# Patient Record
Sex: MALE | NOT HISPANIC OR LATINO | Employment: FULL TIME | URBAN - METROPOLITAN AREA
[De-identification: names, ages, dates, MRNs, and addresses within clinical notes are randomized per-mention and may not be internally consistent; named-entity substitution may affect disease eponyms.]

---

## 2021-09-13 ENCOUNTER — APPOINTMENT (OUTPATIENT)
Dept: CT IMAGING | Facility: CLINIC | Age: 36
DRG: 177 | End: 2021-09-13
Attending: STUDENT IN AN ORGANIZED HEALTH CARE EDUCATION/TRAINING PROGRAM
Payer: COMMERCIAL

## 2021-09-13 ENCOUNTER — HOSPITAL ENCOUNTER (INPATIENT)
Facility: CLINIC | Age: 36
LOS: 1 days | Discharge: HOME | DRG: 177 | End: 2021-09-14
Attending: STUDENT IN AN ORGANIZED HEALTH CARE EDUCATION/TRAINING PROGRAM | Admitting: STUDENT IN AN ORGANIZED HEALTH CARE EDUCATION/TRAINING PROGRAM
Payer: COMMERCIAL

## 2021-09-13 DIAGNOSIS — U07.1 CLINICAL DIAGNOSIS OF COVID-19: ICD-10-CM

## 2021-09-13 DIAGNOSIS — U07.1 COVID-19: Primary | ICD-10-CM

## 2021-09-13 PROBLEM — R19.7 DIARRHEA OF PRESUMED INFECTIOUS ORIGIN: Status: ACTIVE | Noted: 2021-09-13

## 2021-09-13 LAB
ALBUMIN SERPL-MCNC: 3.3 G/DL (ref 3.5–5)
ALP SERPL-CCNC: 62 U/L (ref 45–120)
ALT SERPL W P-5'-P-CCNC: 13 U/L (ref 0–45)
ANION GAP SERPL CALCULATED.3IONS-SCNC: 11 MMOL/L (ref 5–18)
AST SERPL W P-5'-P-CCNC: 23 U/L (ref 0–40)
BASOPHILS # BLD AUTO: 0 10E3/UL (ref 0–0.2)
BASOPHILS NFR BLD AUTO: 0 %
BILIRUB SERPL-MCNC: 0.4 MG/DL (ref 0–1)
BUN SERPL-MCNC: 11 MG/DL (ref 8–22)
C REACTIVE PROTEIN LHE: 4.2 MG/DL (ref 0–0.8)
CALCIUM SERPL-MCNC: 8.4 MG/DL (ref 8.5–10.5)
CHLORIDE BLD-SCNC: 102 MMOL/L (ref 98–107)
CO2 SERPL-SCNC: 25 MMOL/L (ref 22–31)
CREAT SERPL-MCNC: 0.91 MG/DL (ref 0.7–1.3)
D DIMER PPP FEU-MCNC: 0.71 UG/ML FEU (ref 0–0.5)
EOSINOPHIL # BLD AUTO: 0 10E3/UL (ref 0–0.7)
EOSINOPHIL NFR BLD AUTO: 0 %
ERYTHROCYTE [DISTWIDTH] IN BLOOD BY AUTOMATED COUNT: 11.6 % (ref 10–15)
GFR SERPL CREATININE-BSD FRML MDRD: >90 ML/MIN/1.73M2
GLUCOSE BLD-MCNC: 107 MG/DL (ref 70–125)
HCT VFR BLD AUTO: 37.9 % (ref 40–53)
HGB BLD-MCNC: 13 G/DL (ref 13.3–17.7)
IMM GRANULOCYTES # BLD: 0 10E3/UL
IMM GRANULOCYTES NFR BLD: 0 %
LYMPHOCYTES # BLD AUTO: 1 10E3/UL (ref 0.8–5.3)
LYMPHOCYTES NFR BLD AUTO: 18 %
MCH RBC QN AUTO: 31.8 PG (ref 26.5–33)
MCHC RBC AUTO-ENTMCNC: 34.3 G/DL (ref 31.5–36.5)
MCV RBC AUTO: 93 FL (ref 78–100)
MONOCYTES # BLD AUTO: 0.2 10E3/UL (ref 0–1.3)
MONOCYTES NFR BLD AUTO: 5 %
NEUTROPHILS # BLD AUTO: 4.1 10E3/UL (ref 1.6–8.3)
NEUTROPHILS NFR BLD AUTO: 77 %
NRBC # BLD AUTO: 0 10E3/UL
NRBC BLD AUTO-RTO: 0 /100
PLATELET # BLD AUTO: 147 10E3/UL (ref 150–450)
POTASSIUM BLD-SCNC: 4.4 MMOL/L (ref 3.5–5)
PROT SERPL-MCNC: 6.5 G/DL (ref 6–8)
RBC # BLD AUTO: 4.09 10E6/UL (ref 4.4–5.9)
SODIUM SERPL-SCNC: 138 MMOL/L (ref 136–145)
TROPONIN I SERPL-MCNC: <0.01 NG/ML (ref 0–0.29)
TROPONIN T BLD-MCNC: 0 UG/L
WBC # BLD AUTO: 5.4 10E3/UL (ref 4–11)

## 2021-09-13 PROCEDURE — 250N000013 HC RX MED GY IP 250 OP 250 PS 637: Performed by: STUDENT IN AN ORGANIZED HEALTH CARE EDUCATION/TRAINING PROGRAM

## 2021-09-13 PROCEDURE — 85379 FIBRIN DEGRADATION QUANT: CPT | Performed by: STUDENT IN AN ORGANIZED HEALTH CARE EDUCATION/TRAINING PROGRAM

## 2021-09-13 PROCEDURE — 84484 ASSAY OF TROPONIN QUANT: CPT | Performed by: STUDENT IN AN ORGANIZED HEALTH CARE EDUCATION/TRAINING PROGRAM

## 2021-09-13 PROCEDURE — 36415 COLL VENOUS BLD VENIPUNCTURE: CPT | Performed by: STUDENT IN AN ORGANIZED HEALTH CARE EDUCATION/TRAINING PROGRAM

## 2021-09-13 PROCEDURE — 84484 ASSAY OF TROPONIN QUANT: CPT | Mod: 91 | Performed by: STUDENT IN AN ORGANIZED HEALTH CARE EDUCATION/TRAINING PROGRAM

## 2021-09-13 PROCEDURE — 96361 HYDRATE IV INFUSION ADD-ON: CPT

## 2021-09-13 PROCEDURE — 85025 COMPLETE CBC W/AUTO DIFF WBC: CPT | Performed by: STUDENT IN AN ORGANIZED HEALTH CARE EDUCATION/TRAINING PROGRAM

## 2021-09-13 PROCEDURE — 80053 COMPREHEN METABOLIC PANEL: CPT | Performed by: STUDENT IN AN ORGANIZED HEALTH CARE EDUCATION/TRAINING PROGRAM

## 2021-09-13 PROCEDURE — 71275 CT ANGIOGRAPHY CHEST: CPT

## 2021-09-13 PROCEDURE — 96375 TX/PRO/DX INJ NEW DRUG ADDON: CPT

## 2021-09-13 PROCEDURE — 120N000001 HC R&B MED SURG/OB

## 2021-09-13 PROCEDURE — 250N000011 HC RX IP 250 OP 636: Performed by: STUDENT IN AN ORGANIZED HEALTH CARE EDUCATION/TRAINING PROGRAM

## 2021-09-13 PROCEDURE — 99285 EMERGENCY DEPT VISIT HI MDM: CPT | Mod: 25

## 2021-09-13 PROCEDURE — 258N000003 HC RX IP 258 OP 636: Performed by: STUDENT IN AN ORGANIZED HEALTH CARE EDUCATION/TRAINING PROGRAM

## 2021-09-13 PROCEDURE — 86141 C-REACTIVE PROTEIN HS: CPT | Performed by: STUDENT IN AN ORGANIZED HEALTH CARE EDUCATION/TRAINING PROGRAM

## 2021-09-13 PROCEDURE — 250N000009 HC RX 250: Performed by: STUDENT IN AN ORGANIZED HEALTH CARE EDUCATION/TRAINING PROGRAM

## 2021-09-13 PROCEDURE — XW033E5 INTRODUCTION OF REMDESIVIR ANTI-INFECTIVE INTO PERIPHERAL VEIN, PERCUTANEOUS APPROACH, NEW TECHNOLOGY GROUP 5: ICD-10-PCS | Performed by: STUDENT IN AN ORGANIZED HEALTH CARE EDUCATION/TRAINING PROGRAM

## 2021-09-13 PROCEDURE — 93005 ELECTROCARDIOGRAM TRACING: CPT | Performed by: STUDENT IN AN ORGANIZED HEALTH CARE EDUCATION/TRAINING PROGRAM

## 2021-09-13 PROCEDURE — 96365 THER/PROPH/DIAG IV INF INIT: CPT | Mod: 59

## 2021-09-13 RX ORDER — ACETAMINOPHEN 325 MG/1
650 TABLET ORAL ONCE
Status: COMPLETED | OUTPATIENT
Start: 2021-09-13 | End: 2021-09-13

## 2021-09-13 RX ORDER — ONDANSETRON 4 MG/1
4 TABLET, ORALLY DISINTEGRATING ORAL EVERY 6 HOURS PRN
Status: DISCONTINUED | OUTPATIENT
Start: 2021-09-13 | End: 2021-09-14 | Stop reason: HOSPADM

## 2021-09-13 RX ORDER — ONDANSETRON 2 MG/ML
4 INJECTION INTRAMUSCULAR; INTRAVENOUS EVERY 6 HOURS PRN
Status: DISCONTINUED | OUTPATIENT
Start: 2021-09-13 | End: 2021-09-14 | Stop reason: HOSPADM

## 2021-09-13 RX ORDER — LIDOCAINE 40 MG/G
CREAM TOPICAL
Status: DISCONTINUED | OUTPATIENT
Start: 2021-09-13 | End: 2021-09-14 | Stop reason: HOSPADM

## 2021-09-13 RX ORDER — DEXAMETHASONE SODIUM PHOSPHATE 10 MG/ML
8 INJECTION, SOLUTION INTRAMUSCULAR; INTRAVENOUS ONCE
Status: COMPLETED | OUTPATIENT
Start: 2021-09-13 | End: 2021-09-13

## 2021-09-13 RX ORDER — KETOROLAC TROMETHAMINE 15 MG/ML
15 INJECTION, SOLUTION INTRAMUSCULAR; INTRAVENOUS ONCE
Status: COMPLETED | OUTPATIENT
Start: 2021-09-13 | End: 2021-09-13

## 2021-09-13 RX ORDER — ACETAMINOPHEN 325 MG/1
975 TABLET ORAL EVERY 8 HOURS PRN
Status: DISCONTINUED | OUTPATIENT
Start: 2021-09-13 | End: 2021-09-14 | Stop reason: HOSPADM

## 2021-09-13 RX ORDER — ACETAMINOPHEN 325 MG/1
650 TABLET ORAL EVERY 6 HOURS PRN
COMMUNITY

## 2021-09-13 RX ORDER — POLYETHYLENE GLYCOL 3350 17 G/17G
17 POWDER, FOR SOLUTION ORAL DAILY PRN
Status: DISCONTINUED | OUTPATIENT
Start: 2021-09-13 | End: 2021-09-14 | Stop reason: HOSPADM

## 2021-09-13 RX ORDER — IOPAMIDOL 755 MG/ML
100 INJECTION, SOLUTION INTRAVASCULAR ONCE
Status: COMPLETED | OUTPATIENT
Start: 2021-09-13 | End: 2021-09-13

## 2021-09-13 RX ADMIN — REMDESIVIR 200 MG: 100 INJECTION, POWDER, LYOPHILIZED, FOR SOLUTION INTRAVENOUS at 14:57

## 2021-09-13 RX ADMIN — SODIUM CHLORIDE 50 ML: 9 INJECTION, SOLUTION INTRAVENOUS at 15:57

## 2021-09-13 RX ADMIN — ACETAMINOPHEN 650 MG: 325 TABLET ORAL at 13:43

## 2021-09-13 RX ADMIN — SODIUM CHLORIDE 1000 ML: 9 INJECTION, SOLUTION INTRAVENOUS at 10:03

## 2021-09-13 RX ADMIN — KETOROLAC TROMETHAMINE 15 MG: 15 INJECTION, SOLUTION INTRAMUSCULAR; INTRAVENOUS at 14:52

## 2021-09-13 RX ADMIN — DEXAMETHASONE SODIUM PHOSPHATE 8 MG: 10 INJECTION, SOLUTION INTRAMUSCULAR; INTRAVENOUS at 14:53

## 2021-09-13 RX ADMIN — IOPAMIDOL 100 ML: 755 INJECTION, SOLUTION INTRAVENOUS at 12:51

## 2021-09-13 ASSESSMENT — ACTIVITIES OF DAILY LIVING (ADL)
DIFFICULTY_EATING/SWALLOWING: NO
DOING_ERRANDS_INDEPENDENTLY_DIFFICULTY: NO
DIFFICULTY_COMMUNICATING: NO
CONCENTRATING,_REMEMBERING_OR_MAKING_DECISIONS_DIFFICULTY: NO
WEAR_GLASSES_OR_BLIND: NO
DRESSING/BATHING_DIFFICULTY: NO
TOILETING_ISSUES: NO
WALKING_OR_CLIMBING_STAIRS_DIFFICULTY: NO
FALL_HISTORY_WITHIN_LAST_SIX_MONTHS: NO
DEPENDENT_IADLS:: INDEPENDENT

## 2021-09-13 ASSESSMENT — MIFFLIN-ST. JEOR
SCORE: 1826.02
SCORE: 1828.04

## 2021-09-13 NOTE — ED PROVIDER NOTES
"  Emergency Department Encounter         FINAL IMPRESSION:  Covid, dehydration        ED COURSE AND MEDICAL DECISION MAKING       ED Course as of Sep 13 1717   Mon Sep 13, 2021   0954 EKG is sinus rhythm of 63, no signs of acute ischemia, no inversions no depressions , no old EKGs for comparison.      1025 Patient is a healthy 36-year-old here from home with a presyncopal event and diarrhea as well as shortness of breath and cough and Covid positive symptoms for the past week.  States this morning he was getting up and felt like he was in a pass out.  States he got tunnel vision, his ear started ringing and he stated that he sat down all fours as he stated \"I could feel it coming on.\"  Never actually lost consciousness.  Has been Covid positive for the past week.  No chest pain.  Mild cough.  No shortness of breath.  On arrival he is mildly hypoxic.  Gets mildly hypoxic with ambulation  However he is asymptomatic with hypoxia.      -Patient CTA unremarkable. Covid changes. Patient given fluids. Persisting hypoxia with sleeping down to the low to mid 80s. Oxygen placed. Improved. Patient admitted to the resident service. Received Decadron and remdesivir    9:54 AM I reviewed the patient's EKG.  10:13 AM I met the patient and performed my initial interview and exam.  4:25 PM I rechecked and updated the patient.   4:50 PM I spoke with resident Kulwinder, regarding admission for patient.                              EKG  EKG is sinus rhythm of 63, no signs of acute ischemia, no inversions no depressions , no old EKGs for comparison.     At the conclusion of the encounter I discussed the results of all the tests and the disposition. The questions were answered. The patient or family acknowledged understanding and was agreeable with the care plan.                  MEDICATIONS GIVEN IN THE EMERGENCY DEPARTMENT:  Medications   remdesivir 100 mg in sodium chloride 0.9 % 250 mL intermittent infusion (has no " "administration in time range)     And   0.9% sodium chloride BOLUS (has no administration in time range)   0.9% sodium chloride BOLUS (0 mLs Intravenous Stopped 9/13/21 1103)   iopamidol (ISOVUE-370) solution 100 mL (100 mLs Intravenous Given 9/13/21 1251)   acetaminophen (TYLENOL) tablet 650 mg (650 mg Oral Given 9/13/21 1343)   dexamethasone PF (DECADRON) injection 8 mg (8 mg Intravenous Given 9/13/21 1453)   remdesivir 200 mg in sodium chloride 0.9 % 250 mL intermittent infusion (0 mg Intravenous Stopped 9/13/21 1548)     Followed by   0.9% sodium chloride BOLUS (0 mLs Intravenous Stopped 9/13/21 1619)   ketorolac (TORADOL) injection 15 mg (15 mg Intravenous Given 9/13/21 1452)       NEW PRESCRIPTIONS STARTED AT TODAY'S ED VISIT:  New Prescriptions    No medications on file       HPI     Patient information obtained from: Patient    Use of Interpretor: N/A     Bernardino MAYS Olayinka is a 36 year old male with no known pertinent history who presents to this ED via walk in for evaluation of presyncopal event.    Patient reports this morning he was getting up and felt like he was going to pass out. He states he got tunnel vision and his ears began to ring, so he sat down on all fours as he \"could feel it coming on\". Patient never actually lost consciousness. Of note, patient has been COVID positive for the past week. His symptoms include diarrhea, shortness of breath, and a mild cough. Denies chest pain or any other complaints or concerns at this time.     REVIEW OF SYSTEMS:  Review of Systems   Constitutional: Negative for fever, malaise  HEENT: Negative runny nose, sore throat, ear pain, neck pain. Positive for tinnitus.   Respiratory: Negative for congestion. Positive for shortness of breath and cough (mild).  Cardiovascular: Negative for chest pain, leg edema  Gastrointestinal: Negative for abdominal distention, abdominal pain, constipation, vomiting, nausea. Positive for diarrhea.   Genitourinary: Negative for " "dysuria and hematuria.   Integument: Negative for rash, skin breakdown  Neurological: Negative for paresthesias, weakness, headache. Positive for presyncope  Musculoskeletal: Negative for joint pain, joint swelling      All other systems reviewed and are negative.          MEDICAL HISTORY     History reviewed. No pertinent past medical history.    History reviewed. No pertinent surgical history.    Social History     Tobacco Use     Smoking status: None   Substance Use Topics     Alcohol use: None     Drug use: None       acetaminophen (TYLENOL) 325 MG tablet            PHYSICAL EXAM     /72   Pulse 71   Temp 99.7  F (37.6  C) (Oral)   Resp 27   Ht 1.753 m (5' 9\")   Wt 90.3 kg (199 lb)   SpO2 96%   BMI 29.39 kg/m        PHYSICAL EXAM:     General: Patient appears well, nontoxic, comfortable  HEENT: Moist mucous membranes, no tongue swelling.  No head trauma.  No midline neck pain.  Cardiovascular: Normal rate, normal rhythm, no extremity edema.  No appreciable murmur.  Respiratory: Lungs are clear to auscultation bilaterally with no wheezes rhonchi or rales. Mildly hypoxic with ambulation.  Abdominal: Soft, nontender, nondistended, no palpable masses, no guarding, no rebound  Musculoskeletal: Full range of motion of joints, no deformities appreciated.  Neurological: Alert and oriented, grossly neurologically intact.  Psychological: Normal affect and mood.  Integument: No rashes appreciated        RESULTS       Labs Ordered and Resulted from Time of ED Arrival Up to the Time of Departure from the ED   COMPREHENSIVE METABOLIC PANEL - Abnormal; Notable for the following components:       Result Value    Calcium 8.4 (*)     Albumin 3.3 (*)     All other components within normal limits   CBC WITH PLATELETS AND DIFFERENTIAL - Abnormal; Notable for the following components:    RBC Count 4.09 (*)     Hemoglobin 13.0 (*)     Hematocrit 37.9 (*)     Platelet Count 147 (*)     All other components within normal " limits   D DIMER QUANTITATIVE - Abnormal; Notable for the following components:    D-Dimer Quantitative 0.71 (*)     All other components within normal limits    Narrative:     This D-dimer assay is intended for use in conjunction with a clinical pretest probability assessment model to exclude pulmonary embolism (PE) and deep venous thrombosis (DVT) in outpatients suspected of PE or DVT. The cut-off value is 0.50 ug/mL FEU.   TROPONIN I - Normal   ISTAT TROPONIN POCT - Normal   CBC WITH PLATELETS & DIFFERENTIAL    Narrative:     The following orders were created for panel order CBC with platelets differential.  Procedure                               Abnormality         Status                     ---------                               -----------         ------                     CBC with platelets and d...[[id number]]  Abnormal            Final result                 Please view results for these tests on the individual orders.   PERIPHERAL IV CATHETER   CALL       CT Chest Pulmonary Embolism w Contrast   Final Result   IMPRESSION:      1.  Mild motion artifact.      2.  No pulmonary embolism seen.      3.  Mild-moderate pulmonary opacities typical of COVID.      4.  Mild airway thickening / bronchitis.      5.  Mildly prominent right heart of indeterminate etiology and incompletely characterized given this is a nongated exam. Correlate for evidence of elevated right heart pressures.                                 PROCEDURES:  Procedures:  Procedures       I, Cori Wiggins am serving as a scribe to document services personally performed by Conrado Guzman DO, based on my observations and the provider's statements to me.  I, Conrado Guzman DO, attest that Cori Wiggins is acting in a scribe capacity, has observed my performance of the services and has documented them in accordance with my direction.    Conrado Guzman DO  Emergency Medicine  [facility] EMERGENCY ROOM     OhConrado limon,  DO  09/13/21 1738

## 2021-09-13 NOTE — CONSULTS
Care Management Initial Consult    General Information  Assessment completed with: Patient,    Type of CM/SW Visit: Initial Assessment    Primary Care Provider verified and updated as needed:     Readmission within the last 30 days:        Reason for Consult: discharge planning  Advance Care Planning:            Communication Assessment  Patient's communication style: spoken language (English or Bilingual)    Hearing Difficulty or Deaf: no   Wear Glasses or Blind: no    Cognitive  Cognitive/Neuro/Behavioral: WDL                      Living Environment:   People in home: spouse     Current living Arrangements: house      Able to return to prior arrangements: yes       Family/Social Support:  Care provided by: self  Provides care for: no one  Marital Status:   Wife  Rowan       Description of Support System: Supportive    Support Assessment: Adequate family and caregiver support    Current Resources:   Patient receiving home care services: No     Community Resources: None  Equipment currently used at home: none  Supplies currently used at home: None    Employment/Financial:  Employment Status: employed full-time        Financial Concerns: No concerns identified           Lifestyle & Psychosocial Needs:  Social Determinants of Health     Tobacco Use:      Smoking Tobacco Use:      Smokeless Tobacco Use:    Alcohol Use:      Frequency of Alcohol Consumption:      Average Number of Drinks:      Frequency of Binge Drinking:    Financial Resource Strain:      Difficulty of Paying Living Expenses:    Food Insecurity:      Worried About Running Out of Food in the Last Year:      Ran Out of Food in the Last Year:    Transportation Needs:      Lack of Transportation (Medical):      Lack of Transportation (Non-Medical):    Physical Activity:      Days of Exercise per Week:      Minutes of Exercise per Session:    Stress:      Feeling of Stress :    Social Connections:      Frequency of Communication with Friends and  Family:      Frequency of Social Gatherings with Friends and Family:      Attends Mandaen Services:      Active Member of Clubs or Organizations:      Attends Club or Organization Meetings:      Marital Status:    Intimate Partner Violence:      Fear of Current or Ex-Partner:      Emotionally Abused:      Physically Abused:      Sexually Abused:    Depression:      PHQ-2 Score:    Housing Stability:      Unable to Pay for Housing in the Last Year:      Number of Places Lived in the Last Year:      Unstable Housing in the Last Year:        Functional Status:  Prior to admission patient needed assistance:   Dependent ADLs:: Independent  Dependent IADLs:: Independent  Assesssment of Functional Status: At functional baseline    Mental Health Status:          Chemical Dependency Status:                Values/Beliefs:  Spiritual, Cultural Beliefs, Mandaen Practices, Values that affect care:                 Additional Information:  AIDET completed. Writer phoned into Pts ED room 10. Pt lives with spouse Rowan in a private residence. He is independent with all ADL's, has no services and no DME used.  Anticipate pt will DC back home without needs. Family will transport upon DC. Informed that CM will follow progression of care.   EAMON Le      Abbreviation Code:  [facility] (HC), Patient (Pt), Transitional Care Unit (TCU), Skilled Nursing Facility (SNF), Assisted Living (AL), Independent Living (IL), Physical Therapy (PT), Occupational Therapy (OT)        Magy Bermudez RN

## 2021-09-13 NOTE — ED NOTES
Dr. Guzman notified regarding positive orthostatic vital signs. During room air ambulation trial patient oxygen saturation decreased to 89% for less than 15 seconds. When lying in bed after ambulation trial oxygen sat 88% RA for 1 minute. Patient took deep breaths and oxygen sats increased to 93%

## 2021-09-13 NOTE — H&P
Admission History and Physical   Bernardino Bhagat,  1985, MRN [id number]    [facility]  Active Problems:    COVID-19 (2021)      PCP: No Ref-Primary, Physician, None   Code status:  Full Code       Extended Emergency Contact Information  Primary Emergency Contact: LUISITO BHAGAT  Home Phone: [phone]  Relation: Spouse  Secondary Emergency Contact: ISAIAH BERNAL  Home Phone: [phone]  Relation: Mother       Chief Complaint: Dizziness and Headache  Covid-19 pneumonia     Assessment and Plan:   Bernardino Bhagat is a 36 year old male with no significant past medical history who presented to the [facility] Emergency Department on the day of admission with complaints of near-syncope, diarrhea, and covid-19 positive 1 week ago found to have orthostatic hypotension and hypoxia.     Covid-19 pneumonia  Hypoxia  Tested positive last week, had been managing okay at home until past couple of days. He has been having 10 or more watery stools daily and struggling to keep up with hydration. He had a near-syncopal episode today. D-dimer and CRP mildly elevated, admission labs otherwise normal. He had orthostatic hypotension in the ED, desatted to 85% on room air, worse when he sleeps, otherwise vitally stable. CT chest confirmed covid-19 pneumonia, negative for PE. Received 1L NS bolus in ED along with decadron and ramdesevir.  He does not appear dehydrated on exam after receiving IVF bolus, lung sounds are normal, do not think he needs coverage for superimposed bacterial pneumonia, currently stable on room air. He was admitted to med/surg floor for monitoring of hypoxia and near-syncope.   - ramdesivir daily  - decadron daily  - daily CBC, CMP, CRP, D-dimer  - oxygen prn to maintain sats >92%  - continuous pulse oximetry  - avoid aerosolizing procedures/medications  - discussed continuing to have no close contact with his wife and son until after day 10 since symptoms onset and recommended his wife and  "son continue to get tested 5-7 days after potential exposure.   - discussed eligibility for covid-19 vaccination    Orthostatic hypotension  Near-Syncope  Patient presented after near-syncopal episode at home, had orthostatic hypotension in ED. Likely 2/2 severe watery diarrhea and difficulty maintaining hydration. He is s/p 1L IVF bolus in ED, no further episodes of near-syncope, appears euvolemic on exam.  - consider another IVF bolus or mIVF if patient has soft pressures or another near-syncopal episode, otherwise avoid IVF to prevent pulmonary edema in setting of covid-19    Diarrhea  Nausea  Patient has been having 10 or more watery stools daily for past 2 days, does have abdominal pain and nausea associated with it.   - Rule out c. diff  - ondansetron prn     Night time snoring and ?apnea   Patient's wife reports patient snores all night, often will \"choke on his snores,\" patient noted to desaturate more while he sleeps in the ED, does have daytime sleepiness. CT PE run noted a mmildly prominent right heart which would support ELKE.   - recommend outpatient sleep study    Fluids: PO  Diet: Regular   PPX: Enoxaparin (Lovenox) SubQ  Disposition: Home  Status: Inpatient    Patient discussed with attending physician, Dr. Roberto Williamson , who agrees with the plan. Patient will be seen by Dr. Ramos in the morning.     Benita Behm, MD, PGY-2  [facility] Medicine  Please call the senior pager with any questions or concerns, 24/7: [phone].    HPI:    Bernardino Bhagat is a 36 year old old male with no significant past medical history who presented to the [facility] Emergency Department on the day of admission with complaints of near-syncope, diarrhea, and covid-19 positive 1 week ago.      Patient tested positive for covid-19 last week, he was exposed to a coworker who ended up testing positive. He is not vaccinated. He lives with his wife, mother, and 4 year old son. He did spread covid-19 to his mother, his " "wife and son have tested negative three times now and he has been quarantining from his wife and son. His son just started  today.     He has had chills on a couple of occasions, feels fatigued which has worsened over the past few days, has had some nausea that was relieved with ondansetron he had at home, and 10 or more episodes of watery diarrhea per day for the past 2 days. This morning, after patient had an episode of diarrhea, he started to experience tunnel vision, heard ringing in his ears, and felt like he was going to pass out. He got down onto his hands and knees until the episode passed, he did not lose consciousness. He has not had further episodes of diarrhea since presenting to the hospital.     History is provided by patient, who is a reliable historian.      Emergency Department Course:    Upon presentation to the Emergency Department the patient's vital signs were    ED Triage Vitals [09/13/21 0938]   Enc Vitals Group      /60      Pulse 78      Resp 20      Temp 99.7  F (37.6  C)      Temp src Oral      SpO2 90 %      Weight 90.3 kg (199 lb)      Height 1.753 m (5' 9\")      Head Circumference       Peak Flow       Pain Score       Pain Loc       Pain Edu?       Excl. in GC?        Initial evaluation in the Emergency Department: On presentation, patient was vitally stable but found to have orthostatic hypotension. He did desat to mid 80s, worse when he was sleeping, which quickly corrected with 2L O2 via NC. Labs notable for mildly elevated D-dimer and CRP, CBC and CMP wnl. He has a CT PE run which was negative for PE, but showed mild-moderate covid-10 pneumonia and mildly prominent right heart EKG was normal.     Patient was admitted to the Med Surg for further workup and management.     Medical History  History reviewed. No pertinent past medical history.  Patient Active Problem List   Diagnosis     COVID-19     Diarrhea of presumed infectious origin       Reviewed, " changes/additions include: None Surgical History  He  has a past surgical history that includes Left meniscal repair (Left); right biceps tendon repair; and [place] Tooth Extraction.      Reviewed, changes/additions include: None Social History & Habits  he  reports that he has never smoked. He has never used smokeless tobacco. He reports current alcohol use. He reports that he does not use drugs.    Reviewed, changes/additions include:  None    Code Status: Full Code  Marital Status:   Work History: Employed: [employer]  Surrogate decision maker/POA: wife, Rowan        Allergies  Allergies   Allergen Reactions     Amoxicillin Hives     Penicillins Hives       Reviewed, changes/additions include: None Family History  family history includes Diabetes in his maternal grandmother.    Reviewed, changes/additions include: None   Psychosocial Needs  Social History     Social History Narrative     Not on file     Additional psychosocial needs reviewed per nursing assessment.       Prior to Admission Medications   Medications Prior to Admission   Medication Sig Dispense Refill Last Dose     acetaminophen (TYLENOL) 325 MG tablet Take 650 mg by mouth every 6 hours as needed for mild pain   9/13/2021 at 0730           Review of Systems:  A comprehensive review of systems was negative.       Physical Exam:   Temp:  [98.5  F (36.9  C)-99.7  F (37.6  C)] 98.5  F (36.9  C)  Pulse:  [59-97] 76  Resp:  [9-30] 20  BP: (100-130)/(51-73) 122/63  SpO2:  [86 %-97 %] 96 %    General appearance: alert, cooperative, appears stated age and no distress  Head: Normocephalic, without obvious abnormality, atraumatic  Eyes: conjunctivae/corneas clear. PERRL, EOM's intact.   Ears: hearing grossly intact  Nose: nares normal, septum midline, mucosa normal, no drainage  Throat: lips, mucosa, and tongue normal; teeth and gums normal  Lungs: clear to auscultation bilaterally, respirations unlabored  Chest wall: no tenderness to palpation  Heart:  regular rate and rhythm, S1, S2 normal, no murmur, click, rub or gallop  Abdomen: soft, mild tenderness to palpation, worse in RLQ and LLQ; bowel sounds normal; no masses, no organomegaly  Extremities: extremities normal, atraumatic, no cyanosis or edema  Pulses: 2+ and symmetric  Skin: Skin color, texture, turgor normal. No rashes or lesions, non-diaphoretic  Neurologic: CNII-XII intact, normal strength, sensation and reflexes throughout    Pertinent Labs  Lab Results: personally reviewed.   Results for orders placed or performed during the hospital encounter of 09/13/21   CT Chest Pulmonary Embolism w Contrast     Status: None    Narrative    EXAM: CT CHEST PULMONARY EMBOLISM W CONTRAST  LOCATION: [facility]  DATE/TIME: 9/13/2021 12:37 PM    INDICATION: Shortness of breath.  COMPARISON: None.  TECHNIQUE: CT chest pulmonary angiogram during arterial phase injection of IV contrast. Multiplanar reformats and MIP reconstructions were performed. Dose reduction techniques were used.   CONTRAST: Isovue-370 100mL     FINDINGS:  ANGIOGRAM CHEST: No pulmonary embolism. Normal caliber pulmonary trunk. Nonaneurysmal aorta without dissection.    LUNGS AND PLEURA: Mild-moderate bilateral opacities involving all lobes. Mild airway thickening. No pleural effusion or pneumothorax.    MEDIASTINUM/AXILLAE: Few mildly prominent reactive nodes. No pericardial effusion. The RV to LV ratio is near 1 in some locations, as well as the right heart sharing the ventricular apex and slight flattening of the interventricular septum.    CORONARY ARTERY CALCIFICATION: None.    UPPER ABDOMEN: Nothing acute.    MUSCULOSKELETAL: Nothing acute.      Impression    IMPRESSION:    1.  Mild motion artifact.    2.  No pulmonary embolism seen.    3.  Mild-moderate pulmonary opacities typical of COVID.    4.  Mild airway thickening / bronchitis.    5.  Mildly prominent right heart of indeterminate etiology and incompletely  characterized given this is a nongated exam. Correlate for evidence of elevated right heart pressures.         CBC with platelets differential     Status: Abnormal    Narrative    The following orders were created for panel order CBC with platelets differential.  Procedure                               Abnormality         Status                     ---------                               -----------         ------                     CBC with platelets and d...[[id number]]  Abnormal            Final result                 Please view results for these tests on the individual orders.   Comprehensive metabolic panel     Status: Abnormal   Result Value Ref Range    Sodium 138 136 - 145 mmol/L    Potassium 4.4 3.5 - 5.0 mmol/L    Chloride 102 98 - 107 mmol/L    Carbon Dioxide (CO2) 25 22 - 31 mmol/L    Anion Gap 11 5 - 18 mmol/L    Urea Nitrogen 11 8 - 22 mg/dL    Creatinine 0.91 0.70 - 1.30 mg/dL    Calcium 8.4 (L) 8.5 - 10.5 mg/dL    Glucose 107 70 - 125 mg/dL    Alkaline Phosphatase 62 45 - 120 U/L    AST 23 0 - 40 U/L    ALT 13 0 - 45 U/L    Protein Total 6.5 6.0 - 8.0 g/dL    Albumin 3.3 (L) 3.5 - 5.0 g/dL    Bilirubin Total 0.4 0.0 - 1.0 mg/dL    GFR Estimate >90 >60 mL/min/1.73m2   Troponin I     Status: Normal   Result Value Ref Range    Troponin I <0.01 0.00 - 0.29 ng/mL   CBC with platelets and differential     Status: Abnormal   Result Value Ref Range    WBC Count 5.4 4.0 - 11.0 10e3/uL    RBC Count 4.09 (L) 4.40 - 5.90 10e6/uL    Hemoglobin 13.0 (L) 13.3 - 17.7 g/dL    Hematocrit 37.9 (L) 40.0 - 53.0 %    MCV 93 78 - 100 fL    MCH 31.8 26.5 - 33.0 pg    MCHC 34.3 31.5 - 36.5 g/dL    RDW 11.6 10.0 - 15.0 %    Platelet Count 147 (L) 150 - 450 10e3/uL    % Neutrophils 77 %    % Lymphocytes 18 %    % Monocytes 5 %    % Eosinophils 0 %    % Basophils 0 %    % Immature Granulocytes 0 %    NRBCs per 100 WBC 0 <1 /100    Absolute Neutrophils 4.1 1.6 - 8.3 10e3/uL    Absolute Lymphocytes 1.0 0.8 - 5.3 10e3/uL     Absolute Monocytes 0.2 0.0 - 1.3 10e3/uL    Absolute Eosinophils 0.0 0.0 - 0.7 10e3/uL    Absolute Basophils 0.0 0.0 - 0.2 10e3/uL    Absolute Immature Granulocytes 0.0 <=0.0 10e3/uL    Absolute NRBCs 0.0 10e3/uL   iStat Troponin, POCT     Status: Normal   Result Value Ref Range    TROPPC POCT 0.00 <=0.12 ug/L   D dimer quantitative     Status: Abnormal   Result Value Ref Range    D-Dimer Quantitative 0.71 (H) 0.00 - 0.50 ug/mL FEU    Narrative    This D-dimer assay is intended for use in conjunction with a clinical pretest probability assessment model to exclude pulmonary embolism (PE) and deep venous thrombosis (DVT) in outpatients suspected of PE or DVT. The cut-off value is 0.50 ug/mL FEU.   CRP inflammation     Status: Abnormal   Result Value Ref Range    CRP 4.2 (H) 0.0-<0.8 mg/dL   Social Work/ Care Management IP Consult     Status: None ()    Narrative    Magy Bermudez RN     9/13/2021 11:48 AM  Care Management Initial Consult    General Information  Assessment completed with: Patient,    Type of CM/SW Visit: Initial Assessment    Primary Care Provider verified and updated as needed:     Readmission within the last 30 days:        Reason for Consult: discharge planning  Advance Care Planning:            Communication Assessment  Patient's communication style: spoken language (English or   Bilingual)    Hearing Difficulty or Deaf: no   Wear Glasses or Blind: no    Cognitive  Cognitive/Neuro/Behavioral: WDL                      Living Environment:   People in home: spouse     Current living Arrangements: house      Able to return to prior arrangements: yes       Family/Social Support:  Care provided by: self  Provides care for: no one  Marital Status:   Wife  Rowan       Description of Support System: Supportive    Support Assessment: Adequate family and caregiver support    Current Resources:   Patient receiving home care services: No     Community Resources: None  Equipment currently used at  home: none  Supplies currently used at home: None    Employment/Financial:  Employment Status: employed full-time        Financial Concerns: No concerns identified           Lifestyle & Psychosocial Needs:  Social Determinants of Health     Tobacco Use:      Smoking Tobacco Use:      Smokeless Tobacco Use:    Alcohol Use:      Frequency of Alcohol Consumption:      Average Number of Drinks:      Frequency of Binge Drinking:    Financial Resource Strain:      Difficulty of Paying Living Expenses:    Food Insecurity:      Worried About Running Out of Food in the Last Year:      Ran Out of Food in the Last Year:    Transportation Needs:      Lack of Transportation (Medical):      Lack of Transportation (Non-Medical):    Physical Activity:      Days of Exercise per Week:      Minutes of Exercise per Session:    Stress:      Feeling of Stress :    Social Connections:      Frequency of Communication with Friends and Family:      Frequency of Social Gatherings with Friends and Family:      Attends [employer] Services:      Active Member of Clubs or Organizations:      Attends Club or Organization Meetings:      Marital Status:    Intimate Partner Violence:      Fear of Current or Ex-Partner:      Emotionally Abused:      Physically Abused:      Sexually Abused:    Depression:      PHQ-2 Score:    Housing Stability:      Unable to Pay for Housing in the Last Year:      Number of Places Lived in the Last Year:      Unstable Housing in the Last Year:        Functional Status:  Prior to admission patient needed assistance:   Dependent ADLs:: Independent  Dependent IADLs:: Independent  Assesssment of Functional Status: At functional baseline    Mental Health Status:          Chemical Dependency Status:                Values/Beliefs:  Spiritual, Cultural Beliefs, Advent Practices, Values that   affect care:                 Additional Information:  AIDET completed. Writer phoned into Pts ED room 10. Pt lives with   spouse Rowan  in a private residence. He is independent with all   ADL's, has no services and no DME used.  Anticipate pt will DC   back home without needs. Family will transport upon DC. Informed   that CM will follow progression of care.   Magy Bermudez RNCM      Abbreviation Code:  [facility] ([facility]), Home care (HC), Patient (Pt),   Transitional Care Unit (TCU), Skilled Nursing Facility (SNF),   Assisted Living (AL), Independent Living (IL), Physical Therapy   (PT), Occupational Therapy (OT)        Magy Bermudez RN            Pertinent Radiology:  Radiology Results: personally reviewed.     CT Chest Pulmonary Embolism w Contrast  Result Date: 9/13/2021  IMPRESSION: 1.  Mild motion artifact. 2.  No pulmonary embolism seen. 3.  Mild-moderate pulmonary opacities typical of COVID. 4.  Mild airway thickening / bronchitis. 5.  Mildly prominent right heart of indeterminate etiology and incompletely characterized given this is a nongated exam. Correlate for evidence of elevated right heart pressures.       Cardiographics:   EKG Results: personally reviewed.   EKG: normal EKG, normal sinus rhythm.    This note was created with help of Dragon dictation system. Grammatical /typing errors are not intentional.

## 2021-09-13 NOTE — PHARMACY-ADMISSION MEDICATION HISTORY
Pharmacy Note - Admission Medication History    Pertinent Provider Information: None     ______________________________________________________________________    Prior To Admission (PTA) med list completed and updated in EMR.       PTA Med List   Medication Sig Last Dose     acetaminophen (TYLENOL) 325 MG tablet Take 650 mg by mouth every 6 hours as needed for mild pain 9/13/2021 at 0730       Information source(s): Patient  Method of interview communication: N/A    Patient was asked about OTC/herbal products specifically.  PTA med list reflects this.    In the past week, patient estimated taking medication this percent of the time:  greater than 90%.    Allergies were reviewed, assessed, and updated with the patient.      Patient does not use any multi-dose medications prior to admission.    The information provided in this note is only as accurate as the sources available at the time of the update(s).    Thank you for the opportunity to participate in the care of this patient.    Mikhail De Paz RPH  9/13/2021 12:03 PM

## 2021-09-13 NOTE — ED TRIAGE NOTES
Patient tested positive for COVID 1 week ago, had symptoms a couple days before testing. Today presents with diarrhea, lightheadedness, borderline oxygen saturation, headaches. Near-syncope last night. Tylenol 650mg at 0730, headache 4/10

## 2021-09-14 VITALS
BODY MASS INDEX: 29.4 KG/M2 | TEMPERATURE: 98.1 F | HEIGHT: 69 IN | RESPIRATION RATE: 18 BRPM | HEART RATE: 73 BPM | OXYGEN SATURATION: 93 % | WEIGHT: 198.5 LBS | DIASTOLIC BLOOD PRESSURE: 56 MMHG | SYSTOLIC BLOOD PRESSURE: 111 MMHG

## 2021-09-14 LAB
ANION GAP SERPL CALCULATED.3IONS-SCNC: 10 MMOL/L (ref 5–18)
BUN SERPL-MCNC: 10 MG/DL (ref 8–22)
C REACTIVE PROTEIN LHE: 5.5 MG/DL (ref 0–0.8)
CALCIUM SERPL-MCNC: 8.7 MG/DL (ref 8.5–10.5)
CHLORIDE BLD-SCNC: 105 MMOL/L (ref 98–107)
CO2 SERPL-SCNC: 25 MMOL/L (ref 22–31)
CREAT SERPL-MCNC: 0.75 MG/DL (ref 0.7–1.3)
D DIMER PPP FEU-MCNC: 0.71 UG/ML FEU (ref 0–0.5)
ERYTHROCYTE [DISTWIDTH] IN BLOOD BY AUTOMATED COUNT: 11.6 % (ref 10–15)
GFR SERPL CREATININE-BSD FRML MDRD: >90 ML/MIN/1.73M2
GLUCOSE BLD-MCNC: 104 MG/DL (ref 70–125)
HCT VFR BLD AUTO: 36.4 % (ref 40–53)
HGB BLD-MCNC: 12.5 G/DL (ref 13.3–17.7)
MCH RBC QN AUTO: 31.6 PG (ref 26.5–33)
MCHC RBC AUTO-ENTMCNC: 34.3 G/DL (ref 31.5–36.5)
MCV RBC AUTO: 92 FL (ref 78–100)
PLATELET # BLD AUTO: 149 10E3/UL (ref 150–450)
POTASSIUM BLD-SCNC: 4.1 MMOL/L (ref 3.5–5)
RBC # BLD AUTO: 3.96 10E6/UL (ref 4.4–5.9)
SODIUM SERPL-SCNC: 140 MMOL/L (ref 136–145)
WBC # BLD AUTO: 5.2 10E3/UL (ref 4–11)

## 2021-09-14 PROCEDURE — 85027 COMPLETE CBC AUTOMATED: CPT | Performed by: STUDENT IN AN ORGANIZED HEALTH CARE EDUCATION/TRAINING PROGRAM

## 2021-09-14 PROCEDURE — 80048 BASIC METABOLIC PNL TOTAL CA: CPT | Performed by: STUDENT IN AN ORGANIZED HEALTH CARE EDUCATION/TRAINING PROGRAM

## 2021-09-14 PROCEDURE — 36415 COLL VENOUS BLD VENIPUNCTURE: CPT | Performed by: STUDENT IN AN ORGANIZED HEALTH CARE EDUCATION/TRAINING PROGRAM

## 2021-09-14 PROCEDURE — 99222 1ST HOSP IP/OBS MODERATE 55: CPT | Mod: AI | Performed by: STUDENT IN AN ORGANIZED HEALTH CARE EDUCATION/TRAINING PROGRAM

## 2021-09-14 PROCEDURE — 85379 FIBRIN DEGRADATION QUANT: CPT | Performed by: STUDENT IN AN ORGANIZED HEALTH CARE EDUCATION/TRAINING PROGRAM

## 2021-09-14 PROCEDURE — 86141 C-REACTIVE PROTEIN HS: CPT | Performed by: STUDENT IN AN ORGANIZED HEALTH CARE EDUCATION/TRAINING PROGRAM

## 2021-09-14 NOTE — DISCHARGE SUMMARY
[facility]  Discharge Summary - Medicine & Pediatrics       Date of Admission:  9/13/2021  Date of Discharge:  9/14/2021  Discharging Provider: Gabriela William MD PGY-1, Gabriela Ramos MD  Discharge Service: [facility] Residency Service    Discharge Diagnoses   COVID-19 Pneumonia  Hypoxia    Follow-ups Needed After Discharge   Follow up with your PCP within 7 days.  Recommend outpatient sleep study for possible sleep apnea.     Unresulted Labs Ordered in the Past 30 Days of this Admission     No orders found for last 31 day(s).        Discharge Disposition   Discharged to home  Condition at discharge: Stable    Hospital Course   Bernardino Bhagat was admitted on 9/13/2021 for near syncope, diarrhea, and COVID-19 pneumonia.  The following problems were addressed during his hospitalization:    COVID-19 Pneumonia  Hypoxia  Bernardino Bhagat is an otherwise healthy 36 year old man, diagnosed with COVID-19 about 1 week ago. Prior to presenting to the ER, he had a near syncopal episode likely secondary to his ongoing diarrhea he had been having. In the ED, he was positive for orthostatic hypotension, and hypoxic to 85%. He was given a 1 L IVF bolus and admitted for increased oxygen needs. His CRP, D-dimer was mildly elevated. He had a Chest CT that was negative for PE, but showed moderate COVID pneumonia. He received decadron and remdesivir in the ED as well. Overnight, he improved. He was no longer needing oxygen and was 94% on room air. He denied further lightheaded or dizziness, and his diarrhea had decreased. He was tolerating food and fluids. He was deemed appropriate for discharge. He was discharged to home on Eliquis 2.5 mg BID for 30 days.     Consultations This Hospital Stay   SOCIAL WORK IP CONSULT    Code Status   Full Code       The patient was discussed with Dr. Gabriela Ramos MD.     Gabriela William MD PGY-1  [facility] Residency Service  [facility] [address]  [address] [address]  Phone: [phone]  Fax: [phone]  ______________________________________________________________________    Physical Exam   Vital Signs: Temp: 98.1  F (36.7  C) Temp src: Oral BP: 111/56 Pulse: 73   Resp: 18 SpO2: 93 % on room air.    Weight: 198 lbs 8 oz  Physical Exam   Constitutional: Awake, alert, cooperative, no apparent distress, and appears stated age.  Eyes: Lids and lashes normal, extra ocular muscles intact, sclera clear, conjunctiva normal.  ENT: Normocephalic, without obvious abnormality, atraumatic oral pharynx with moist mucus membranes  Lungs: No increased work of breathing, good air exchange, clear to auscultation bilaterally, no crackles or wheezing.  Cardiovascular: Regular rate and rhythm, normal S1 and S2, no S3 or S4, and no murmur noted.  Abdomen: No scars, normal bowel sounds, soft, non-distended, non-tender  Musculoskeletal: Full range of motion noted.  Motor strength is 5 out of 5 all extremities bilaterally.  Tone is normal.  Neurologic: Awake and alert. Cranial nerves II-XII are grossly intact.  Motor is 5 out of 5 bilaterally. Gait is normal.  Neuropsychiatric: Normal affect, mood, orientation, memory and insight.  Skin: No rashes, erythema, pallor, petechia or purpura on exposed skin.         Primary Care Physician   Physician No Ref-Primary    Discharge Orders      COVID-19 GetWell Loop Referral      Reason for your hospital stay    COVID-19 Pneumonia     Contact provider    Contact your primary care provider if If increased trouble breathing, new arm/leg swelling, dizziness/passing out, falls, bleeding that doesn't stop, or uncontrolled pain.     Discharge - Quarantine/Isolation Instruction    Date of symptom onset:     Date of first positive test:    Stay home and away from others (self-isolate) for at least 20 days from when your symptoms started And...   You've had no fevers and no medicine that reduces fever for 1 full day  (24 hours)  And...   Your other symptoms have resolved (gotten better).     Follow-up and recommended labs and tests     for vaccination     Reason for your hospital stay    COVID-19 Pneumonia  Hypoxia  Near Syncope     Follow-up and recommended labs and tests     Follow up with your PCP within 7 days.     Activity    Your activity upon discharge: As tolerated     Diet    Follow this diet upon discharge: Regular       Significant Results and Procedures   Most Recent 3 CBC's:  Recent Labs   Lab Test 09/14/21  0605 09/13/21  0959   WBC 5.2 5.4   HGB 12.5* 13.0*   MCV 92 93   * 147*     Most Recent 3 BMP's:  Recent Labs   Lab Test 09/14/21  0605 09/13/21  0959    138   POTASSIUM 4.1 4.4   CHLORIDE 105 102   CO2 25 25   BUN 10 11   CR 0.75 0.91   ANIONGAP 10 11   NESS 8.7 8.4*    107     Most Recent 3 Troponin's:No lab results found.  Most Recent D-dimer:  Recent Labs   Lab Test 09/14/21  0605   DD 0.71*   ,   Results for orders placed or performed during the hospital encounter of 09/13/21   CT Chest Pulmonary Embolism w Contrast    Narrative    EXAM: CT CHEST PULMONARY EMBOLISM W CONTRAST  LOCATION: [facility]  DATE/TIME: 9/13/2021 12:37 PM    INDICATION: Shortness of breath.  COMPARISON: None.  TECHNIQUE: CT chest pulmonary angiogram during arterial phase injection of IV contrast. Multiplanar reformats and MIP reconstructions were performed. Dose reduction techniques were used.   CONTRAST: Isovue-370 100mL     FINDINGS:  ANGIOGRAM CHEST: No pulmonary embolism. Normal caliber pulmonary trunk. Nonaneurysmal aorta without dissection.    LUNGS AND PLEURA: Mild-moderate bilateral opacities involving all lobes. Mild airway thickening. No pleural effusion or pneumothorax.    MEDIASTINUM/AXILLAE: Few mildly prominent reactive nodes. No pericardial effusion. The RV to LV ratio is near 1 in some locations, as well as the right heart sharing the ventricular apex and slight flattening  of the interventricular septum.    CORONARY ARTERY CALCIFICATION: None.    UPPER ABDOMEN: Nothing acute.    MUSCULOSKELETAL: Nothing acute.      Impression    IMPRESSION:    1.  Mild motion artifact.    2.  No pulmonary embolism seen.    3.  Mild-moderate pulmonary opacities typical of COVID.    4.  Mild airway thickening / bronchitis.    5.  Mildly prominent right heart of indeterminate etiology and incompletely characterized given this is a nongated exam. Correlate for evidence of elevated right heart pressures.               Discharge Medications   Discharge Medication List as of 9/14/2021 12:30 PM      START taking these medications    Details   apixaban ANTICOAGULANT (ELIQUIS) 2.5 MG tablet Take 1 tablet (2.5 mg) by mouth 2 times daily, Disp-60 tablet, R-0, E-Prescribe         CONTINUE these medications which have NOT CHANGED    Details   acetaminophen (TYLENOL) 325 MG tablet Take 650 mg by mouth every 6 hours as needed for mild pain, Historical           Allergies   Allergies   Allergen Reactions     Amoxicillin Hives     Penicillins Hives

## 2021-09-14 NOTE — PLAN OF CARE
Problem: Adult Inpatient Plan of Care  Goal: Plan of Care Review  9/14/2021 0554 by Monique Handley RN  Outcome: No Change  Pt is doing okay.  We have 2L NC on for overnight as he desats when sleeping.    He is alert and oriented, independent in his room.  We will continue to assess and monitor.

## 2021-09-14 NOTE — PLAN OF CARE
Nurse teaching given on 09/14/21 and the patient expresses understanding and acceptance of instructions. Patient discharged home via family. Jenny Frias RN 9/14/2021 12:47 PM

## 2021-09-14 NOTE — PLAN OF CARE
VSS. On RA at rest 96%. Settled pt to unit. LS clear, slightly diminished in posterior low bases. IS encouraged. IVSL    Sheila Butler RN

## 2021-09-16 LAB
ATRIAL RATE - MUSE: 63 BPM
DIASTOLIC BLOOD PRESSURE - MUSE: 66 MMHG
INTERPRETATION ECG - MUSE: NORMAL
P AXIS - MUSE: 53 DEGREES
PR INTERVAL - MUSE: 126 MS
QRS DURATION - MUSE: 104 MS
QT - MUSE: 392 MS
QTC - MUSE: 401 MS
R AXIS - MUSE: 18 DEGREES
SYSTOLIC BLOOD PRESSURE - MUSE: 100 MMHG
T AXIS - MUSE: 31 DEGREES
VENTRICULAR RATE- MUSE: 63 BPM

## 2022-10-31 NOTE — PLAN OF CARE
Problem: Adult Inpatient Plan of Care  Goal: Plan of Care Review  Outcome: Improving   Pt is feeling a little better.  He is currently no longer having diarrhea, but if begins again we will grab a sample to check for c-diff. Vital signs are stable, however we did apply O2 for overnight.  Pt states when he starts drifting off to sleep, the oximeter beeps.  We will continue to assess and monitor.    30.7

## 2023-09-20 ENCOUNTER — APPOINTMENT (OUTPATIENT)
Dept: URBAN - METROPOLITAN AREA CLINIC 260 | Age: 38
Setting detail: DERMATOLOGY
End: 2023-09-23

## 2023-09-20 VITALS — HEIGHT: 60 IN | WEIGHT: 185 LBS

## 2023-09-20 DIAGNOSIS — L70.0 ACNE VULGARIS: ICD-10-CM

## 2023-09-20 PROCEDURE — OTHER COUNSELING: OTHER

## 2023-09-20 PROCEDURE — OTHER ADDITIONAL NOTES: OTHER

## 2023-09-20 PROCEDURE — OTHER MIPS QUALITY: OTHER

## 2023-09-20 PROCEDURE — 99202 OFFICE O/P NEW SF 15 MIN: CPT

## 2023-09-20 ASSESSMENT — LOCATION SIMPLE DESCRIPTION DERM: LOCATION SIMPLE: UPPER BACK

## 2023-09-20 ASSESSMENT — SEVERITY ASSESSMENT OVERALL AMONG ALL PATIENTS
IN YOUR EXPERIENCE, AMONG ALL PATIENTS YOU HAVE SEEN WITH THIS CONDITION, HOW SEVERE IS THIS PATIENT'S CONDITION?: FEW INFLAMMATORY LESIONS, SOME NONINFLAMMATORY

## 2023-09-20 ASSESSMENT — LOCATION DETAILED DESCRIPTION DERM: LOCATION DETAILED: SUPERIOR THORACIC SPINE

## 2023-09-20 ASSESSMENT — LOCATION ZONE DERM: LOCATION ZONE: TRUNK

## 2023-09-20 NOTE — PROCEDURE: ADDITIONAL NOTES
Additional Notes: - Discussed the first option is Oral Minocycline and topical Clindamycin and the second option is Isotretinoin.\\n- Patient will call the office back to let us know which treatment options he wants to proceed with.
Detail Level: Simple
Render Risk Assessment In Note?: no

## 2023-09-20 NOTE — PROCEDURE: COUNSELING
Birth Control Pills Pregnancy And Lactation Text: This medication should be avoided if pregnant and for the first 30 days post-partum.
Dapsone Pregnancy And Lactation Text: This medication is Pregnancy Category C and is not considered safe during pregnancy or breast feeding.
Spironolactone Pregnancy And Lactation Text: This medication can cause feminization of the male fetus and should be avoided during pregnancy. The active metabolite is also found in breast milk.
High Dose Vitamin A Counseling: Side effects reviewed, pt to contact office should one occur.
Azelaic Acid Counseling: Patient counseled that medicine may cause skin irritation and to avoid applying near the eyes.  In the event of skin irritation, the patient was advised to reduce the amount of the drug applied or use it less frequently.   The patient verbalized understanding of the proper use and possible adverse effects of azelaic acid.  All of the patient's questions and concerns were addressed.
Sarecycline Pregnancy And Lactation Text: This medication is Pregnancy Category D and not consider safe during pregnancy. It is also excreted in breast milk.
Isotretinoin Counseling: Patient should get monthly blood tests, not donate blood, not drive at night if vision affected, not share medication, and not undergo elective surgery for 6 months after tx completed. Side effects reviewed, pt to contact office should one occur.
Detail Level: Zone
Benzoyl Peroxide Counseling: Patient counseled that medicine may cause skin irritation and bleach clothing.  In the event of skin irritation, the patient was advised to reduce the amount of the drug applied or use it less frequently.   The patient verbalized understanding of the proper use and possible adverse effects of benzoyl peroxide.  All of the patient's questions and concerns were addressed.
Topical Clindamycin Pregnancy And Lactation Text: This medication is Pregnancy Category B and is considered safe during pregnancy. It is unknown if it is excreted in breast milk.
Include Pregnancy/Lactation Warning?: No
Winlevi Counseling:  I discussed with the patient the risks of topical clascoterone including but not limited to erythema, scaling, itching, and stinging. Patient voiced their understanding.
Bactrim Pregnancy And Lactation Text: This medication is Pregnancy Category D and is known to cause fetal risk.  It is also excreted in breast milk.
Erythromycin Counseling:  I discussed with the patient the risks of erythromycin including but not limited to GI upset, allergic reaction, drug rash, diarrhea, increase in liver enzymes, and yeast infections.
Tazorac Pregnancy And Lactation Text: This medication is not safe during pregnancy. It is unknown if this medication is excreted in breast milk.
High Dose Vitamin A Pregnancy And Lactation Text: High dose vitamin A therapy is contraindicated during pregnancy and breast feeding.
Aklief counseling:  Patient advised to apply a pea-sized amount only at bedtime and wait 30 minutes after washing their face before applying.  If too drying, patient may add a non-comedogenic moisturizer.  The most commonly reported side effects including irritation, redness, scaling, dryness, stinging, burning, itching, and increased risk of sunburn.  The patient verbalized understanding of the proper use and possible adverse effects of retinoids.  All of the patient's questions and concerns were addressed.
Topical Retinoid Pregnancy And Lactation Text: This medication is Pregnancy Category C. It is unknown if this medication is excreted in breast milk.
Azithromycin Pregnancy And Lactation Text: This medication is considered safe during pregnancy and is also secreted in breast milk.
Doxycycline Counseling:  Patient counseled regarding possible photosensitivity and increased risk for sunburn.  Patient instructed to avoid sunlight, if possible.  When exposed to sunlight, patients should wear protective clothing, sunglasses, and sunscreen.  The patient was instructed to call the office immediately if the following severe adverse effects occur:  hearing changes, easy bruising/bleeding, severe headache, or vision changes.  The patient verbalized understanding of the proper use and possible adverse effects of doxycycline.  All of the patient's questions and concerns were addressed.
Dapsone Counseling: I discussed with the patient the risks of dapsone including but not limited to hemolytic anemia, agranulocytosis, rashes, methemoglobinemia, kidney failure, peripheral neuropathy, headaches, GI upset, and liver toxicity.  Patients who start dapsone require monitoring including baseline LFTs and weekly CBCs for the first month, then every month thereafter.  The patient verbalized understanding of the proper use and possible adverse effects of dapsone.  All of the patient's questions and concerns were addressed.
Azelaic Acid Pregnancy And Lactation Text: This medication is considered safe during pregnancy and breast feeding.
Tetracycline Counseling: Patient counseled regarding possible photosensitivity and increased risk for sunburn.  Patient instructed to avoid sunlight, if possible.  When exposed to sunlight, patients should wear protective clothing, sunglasses, and sunscreen.  The patient was instructed to call the office immediately if the following severe adverse effects occur:  hearing changes, easy bruising/bleeding, severe headache, or vision changes.  The patient verbalized understanding of the proper use and possible adverse effects of tetracycline.  All of the patient's questions and concerns were addressed. Patient understands to avoid pregnancy while on therapy due to potential birth defects.
Benzoyl Peroxide Pregnancy And Lactation Text: This medication is Pregnancy Category C. It is unknown if benzoyl peroxide is excreted in breast milk.
Topical Sulfur Applications Counseling: Topical Sulfur Counseling: Patient counseled that this medication may cause skin irritation or allergic reactions.  In the event of skin irritation, the patient was advised to reduce the amount of the drug applied or use it less frequently.   The patient verbalized understanding of the proper use and possible adverse effects of topical sulfur application.  All of the patient's questions and concerns were addressed.
Topical Clindamycin Counseling: Patient counseled that this medication may cause skin irritation or allergic reactions.  In the event of skin irritation, the patient was advised to reduce the amount of the drug applied or use it less frequently.   The patient verbalized understanding of the proper use and possible adverse effects of clindamycin.  All of the patient's questions and concerns were addressed.
Spironolactone Counseling: Patient advised regarding risks of diarrhea, abdominal pain, hyperkalemia, birth defects (for female patients), liver toxicity and renal toxicity. The patient may need blood work to monitor liver and kidney function and potassium levels while on therapy. The patient verbalized understanding of the proper use and possible adverse effects of spironolactone.  All of the patient's questions and concerns were addressed.
Isotretinoin Pregnancy And Lactation Text: This medication is Pregnancy Category X and is considered extremely dangerous during pregnancy. It is unknown if it is excreted in breast milk.
Birth Control Pills Counseling: Birth Control Pill Counseling: I discussed with the patient the potential side effects of OCPs including but not limited to increased risk of stroke, heart attack, thrombophlebitis, deep venous thrombosis, hepatic adenomas, breast changes, GI upset, headaches, and depression.  The patient verbalized understanding of the proper use and possible adverse effects of OCPs. All of the patient's questions and concerns were addressed.
Doxycycline Pregnancy And Lactation Text: This medication is Pregnancy Category D and not consider safe during pregnancy. It is also excreted in breast milk but is considered safe for shorter treatment courses.
Bactrim Counseling:  I discussed with the patient the risks of sulfa antibiotics including but not limited to GI upset, allergic reaction, drug rash, diarrhea, dizziness, photosensitivity, and yeast infections.  Rarely, more serious reactions can occur including but not limited to aplastic anemia, agranulocytosis, methemoglobinemia, blood dyscrasias, liver or kidney failure, lung infiltrates or desquamative/blistering drug rashes.
Minocycline Counseling: Patient advised regarding possible photosensitivity and discoloration of the teeth, skin, lips, tongue and gums.  Patient instructed to avoid sunlight, if possible.  When exposed to sunlight, patients should wear protective clothing, sunglasses, and sunscreen.  The patient was instructed to call the office immediately if the following severe adverse effects occur:  hearing changes, easy bruising/bleeding, severe headache, or vision changes.  The patient verbalized understanding of the proper use and possible adverse effects of minocycline.  All of the patient's questions and concerns were addressed.
Erythromycin Pregnancy And Lactation Text: This medication is Pregnancy Category B and is considered safe during pregnancy. It is also excreted in breast milk.
Sarecycline Counseling: Patient advised regarding possible photosensitivity and discoloration of the teeth, skin, lips, tongue and gums.  Patient instructed to avoid sunlight, if possible.  When exposed to sunlight, patients should wear protective clothing, sunglasses, and sunscreen.  The patient was instructed to call the office immediately if the following severe adverse effects occur:  hearing changes, easy bruising/bleeding, severe headache, or vision changes.  The patient verbalized understanding of the proper use and possible adverse effects of sarecycline.  All of the patient's questions and concerns were addressed.
Aklief Pregnancy And Lactation Text: It is unknown if this medication is safe to use during pregnancy.  It is unknown if this medication is excreted in breast milk.  Breastfeeding women should use the topical cream on the smallest area of the skin for the shortest time needed while breastfeeding.  Do not apply to nipple and areola.
Tazorac Counseling:  Patient advised that medication is irritating and drying.  Patient may need to apply sparingly and wash off after an hour before eventually leaving it on overnight.  The patient verbalized understanding of the proper use and possible adverse effects of tazorac.  All of the patient's questions and concerns were addressed.
Topical Retinoid counseling:  Patient advised to apply a pea-sized amount only at bedtime and wait 30 minutes after washing their face before applying.  If too drying, patient may add a non-comedogenic moisturizer. The patient verbalized understanding of the proper use and possible adverse effects of retinoids.  All of the patient's questions and concerns were addressed.
Topical Sulfur Applications Pregnancy And Lactation Text: This medication is Pregnancy Category C and has an unknown safety profile during pregnancy. It is unknown if this topical medication is excreted in breast milk.
Winlevi Pregnancy And Lactation Text: This medication is considered safe during pregnancy and breastfeeding.
Azithromycin Counseling:  I discussed with the patient the risks of azithromycin including but not limited to GI upset, allergic reaction, drug rash, diarrhea, and yeast infections.